# Patient Record
Sex: MALE
[De-identification: names, ages, dates, MRNs, and addresses within clinical notes are randomized per-mention and may not be internally consistent; named-entity substitution may affect disease eponyms.]

---

## 2021-11-23 ENCOUNTER — NON-APPOINTMENT (OUTPATIENT)
Age: 41
End: 2021-11-23

## 2021-11-23 ENCOUNTER — APPOINTMENT (OUTPATIENT)
Dept: PULMONOLOGY | Facility: CLINIC | Age: 41
End: 2021-11-23
Payer: COMMERCIAL

## 2021-11-23 VITALS
HEART RATE: 67 BPM | OXYGEN SATURATION: 100 % | DIASTOLIC BLOOD PRESSURE: 67 MMHG | SYSTOLIC BLOOD PRESSURE: 126 MMHG | TEMPERATURE: 97.8 F

## 2021-11-23 DIAGNOSIS — R07.89 OTHER CHEST PAIN: ICD-10-CM

## 2021-11-23 PROBLEM — Z00.00 ENCOUNTER FOR PREVENTIVE HEALTH EXAMINATION: Status: ACTIVE | Noted: 2021-11-23

## 2021-11-23 PROCEDURE — 71046 X-RAY EXAM CHEST 2 VIEWS: CPT

## 2021-11-23 PROCEDURE — 99244 OFF/OP CNSLTJ NEW/EST MOD 40: CPT | Mod: 25

## 2021-11-23 NOTE — CONSULT LETTER
[Dear  ___] : Dear  [unfilled], [Courtesy Letter:] : I had the pleasure of seeing your patient, [unfilled], in my office today. [Please see my note below.] : Please see my note below. [Consult Closing:] : Thank you very much for allowing me to participate in the care of this patient.  If you have any questions, please do not hesitate to contact me. [Sincerely,] : Sincerely, [FreeTextEntry3] : Dr. Latasha Ghosh

## 2021-11-23 NOTE — PROCEDURE
[FreeTextEntry1] : \par  Xray Chest 2 Views PA/Lat             Final\par  \par Chest x-ray PA and lateral views performed in my office today showed clear lungs, no evidence of infiltrates or pleural effusions. \par \par \par  Ordered by: RICH CORRAL       Collected/Examined: 23Nov2021 11:17AM       \par Verification Required       Stage: Final       \par  Performed at: In Office       Performed by: RICH CORRAL       Resulted: 23Nov2021 11:17AM       Last Updated: 23Nov2021 11:17AM

## 2021-11-23 NOTE — HISTORY OF PRESENT ILLNESS
[TextBox_4] : Michel is a pleasant 41-year-old gentleman with no significant past medical history, he came in complaining of worsening bilateral chest and back pain for the last 2 days, he states that his chest pain usually gets worse with respiration, he also has mild shortness of breath, he does not have cough, fever or chills.  He states that he did a lot of yard work the day before the onset of chest pain.

## 2021-11-23 NOTE — ASSESSMENT
[FreeTextEntry1] : Venipuncture with labs drawn in office\par I advised Michel to use NSAIDs, such as Motrin, for atypical chest pain.\par Also advised him to closely follow with you clinically.

## 2021-11-23 NOTE — DISCUSSION/SUMMARY
[FreeTextEntry1] : Michel is a patient with bilateral atypical chest pain, likely secondary to costochondritis, rule out fibromyalgia, rule out PE (unlikely)

## 2021-11-24 ENCOUNTER — NON-APPOINTMENT (OUTPATIENT)
Age: 41
End: 2021-11-24

## 2021-11-24 ENCOUNTER — APPOINTMENT (OUTPATIENT)
Dept: ORTHOPEDIC SURGERY | Facility: CLINIC | Age: 41
End: 2021-11-24
Payer: COMMERCIAL

## 2021-11-24 VITALS
OXYGEN SATURATION: 98 % | HEART RATE: 64 BPM | DIASTOLIC BLOOD PRESSURE: 77 MMHG | BODY MASS INDEX: 27.83 KG/M2 | SYSTOLIC BLOOD PRESSURE: 146 MMHG | WEIGHT: 210 LBS | HEIGHT: 73 IN

## 2021-11-24 DIAGNOSIS — M54.6 PAIN IN THORACIC SPINE: ICD-10-CM

## 2021-11-24 LAB
ANION GAP SERPL CALC-SCNC: 14 MMOL/L
BASOPHILS # BLD AUTO: 0.03 K/UL
BASOPHILS NFR BLD AUTO: 0.4 %
BUN SERPL-MCNC: 20 MG/DL
CALCIUM SERPL-MCNC: 9.7 MG/DL
CHLORIDE SERPL-SCNC: 104 MMOL/L
CO2 SERPL-SCNC: 24 MMOL/L
CREAT SERPL-MCNC: 0.98 MG/DL
DEPRECATED D DIMER PPP IA-ACNC: <150 NG/ML DDU
EOSINOPHIL # BLD AUTO: 0.08 K/UL
EOSINOPHIL NFR BLD AUTO: 1.1 %
ERYTHROCYTE [SEDIMENTATION RATE] IN BLOOD BY WESTERGREN METHOD: 18 MM/HR
GLUCOSE SERPL-MCNC: 128 MG/DL
HCT VFR BLD CALC: 44 %
HGB BLD-MCNC: 13.3 G/DL
IMM GRANULOCYTES NFR BLD AUTO: 0.1 %
LYMPHOCYTES # BLD AUTO: 1.22 K/UL
LYMPHOCYTES NFR BLD AUTO: 16.8 %
MAN DIFF?: NORMAL
MCHC RBC-ENTMCNC: 21.6 PG
MCHC RBC-ENTMCNC: 30.2 GM/DL
MCV RBC AUTO: 71.4 FL
MONOCYTES # BLD AUTO: 0.56 K/UL
MONOCYTES NFR BLD AUTO: 7.7 %
NEUTROPHILS # BLD AUTO: 5.37 K/UL
NEUTROPHILS NFR BLD AUTO: 73.9 %
PLATELET # BLD AUTO: 201 K/UL
POTASSIUM SERPL-SCNC: 4.3 MMOL/L
RBC # BLD: 6.16 M/UL
RBC # FLD: 15.2 %
SODIUM SERPL-SCNC: 142 MMOL/L
WBC # FLD AUTO: 7.27 K/UL

## 2021-11-24 PROCEDURE — 99204 OFFICE O/P NEW MOD 45 MIN: CPT | Mod: 25

## 2021-11-24 PROCEDURE — 20553 NJX 1/MLT TRIGGER POINTS 3/>: CPT

## 2021-11-24 PROCEDURE — 72080 X-RAY EXAM THORACOLMB 2/> VW: CPT

## 2021-11-24 RX ORDER — PREDNISONE 50 MG/1
50 TABLET ORAL DAILY
Qty: 5 | Refills: 0 | Status: ACTIVE | COMMUNITY
Start: 2021-11-24 | End: 1900-01-01

## 2021-11-24 RX ORDER — CYCLOBENZAPRINE HYDROCHLORIDE 5 MG/1
5 TABLET, FILM COATED ORAL
Qty: 28 | Refills: 0 | Status: ACTIVE | COMMUNITY
Start: 2021-11-24 | End: 1900-01-01

## 2023-06-14 ENCOUNTER — APPOINTMENT (OUTPATIENT)
Dept: UROLOGY | Facility: CLINIC | Age: 43
End: 2023-06-14
Payer: COMMERCIAL

## 2023-06-14 VITALS
OXYGEN SATURATION: 99 % | WEIGHT: 210 LBS | HEART RATE: 54 BPM | DIASTOLIC BLOOD PRESSURE: 71 MMHG | BODY MASS INDEX: 27.83 KG/M2 | SYSTOLIC BLOOD PRESSURE: 130 MMHG | HEIGHT: 73 IN

## 2023-06-14 DIAGNOSIS — I86.1 SCROTAL VARICES: ICD-10-CM

## 2023-06-14 DIAGNOSIS — N50.82 SCROTAL PAIN: ICD-10-CM

## 2023-06-14 PROCEDURE — 99204 OFFICE O/P NEW MOD 45 MIN: CPT

## 2023-06-14 NOTE — PHYSICAL EXAM
[Normal Appearance] : normal appearance [Edema] : no peripheral edema [Exaggerated Use Of Accessory Muscles For Inspiration] : no accessory muscle use [Epididymis] : the epididymides were normal [Testes Tenderness] : no tenderness of the testes [Testes Mass (___cm)] : there were no testicular masses [FreeTextEntry1] : Left GIII varicocele. Right GII varicocele

## 2023-06-14 NOTE — HISTORY OF PRESENT ILLNESS
[FreeTextEntry1] : KATHY MEDEIROS is a 42 year M who presents today as a new patient evaluation for varicocele.\par \par Enlarged left "veins" in scrotum for several years. Had vasectomy - not interested in additional children. \par Has aching in this testicle - worse with running, when testicles are low hanging. Pain also worse when his back pain is worse. Pain is dull pain. \par Not too bothersome\par \par Denies gross hematuria, flank pain, fevers, chills, nausea, vomiting.

## 2023-06-14 NOTE — ASSESSMENT
[FreeTextEntry1] : 42 y.o. M with bilateral varicoceles\par - Discussed that varicoceles are common etiology and are present in approximate 1 out of 6 men.\par - Discussed the reason that varicoceles occur is due to impaired drainage from the left gonadal vein into the renal vein\par - Discussed the reasons for intervention are impaired cosmesis, pain, infertility.  Discussed options would include varicocelectomy or embolization \par - He is minimally bothered at this time and prefers observation. He will contact our office in the future if he desires surgery

## 2023-06-14 NOTE — LETTER BODY
[Dear  ___] : Dear  [unfilled], [Consult Letter:] : I had the pleasure of evaluating your patient, [unfilled]. [Please see my note below.] : Please see my note below. [Consult Closing:] : Thank you very much for allowing me to participate in the care of this patient.  If you have any questions, please do not hesitate to contact me. [Sincerely,] : Sincerely, [FreeTextEntry2] : Blayne Reich MD\par [address] [place] [address], [place] [address]\par  [FreeTextEntry3] : Abram Ronquillo MD\par [facility]\par [address] [place] [address], [address]\par [place], [place]\par [address]\par p: [phone]\par f: [phone]

## 2024-03-26 ENCOUNTER — NON-APPOINTMENT (OUTPATIENT)
Age: 44
End: 2024-03-26

## 2024-03-27 ENCOUNTER — EMERGENCY (EMERGENCY)
Facility: HOSPITAL | Age: 44
LOS: 1 days | Discharge: HOME | End: 2024-03-27
Attending: EMERGENCY MEDICINE
Payer: COMMERCIAL

## 2024-03-27 VITALS
HEART RATE: 82 BPM | DIASTOLIC BLOOD PRESSURE: 89 MMHG | HEIGHT: 72 IN | TEMPERATURE: 98 F | OXYGEN SATURATION: 100 % | SYSTOLIC BLOOD PRESSURE: 151 MMHG | WEIGHT: 220.02 LBS | RESPIRATION RATE: 18 BRPM

## 2024-03-27 PROCEDURE — 99285 EMERGENCY DEPT VISIT HI MDM: CPT

## 2024-03-28 VITALS
SYSTOLIC BLOOD PRESSURE: 130 MMHG | DIASTOLIC BLOOD PRESSURE: 65 MMHG | RESPIRATION RATE: 19 BRPM | HEART RATE: 60 BPM | TEMPERATURE: 98 F | OXYGEN SATURATION: 98 %

## 2024-03-28 LAB
ALBUMIN SERPL ELPH-MCNC: 4.7 G/DL — SIGNIFICANT CHANGE UP (ref 3.3–5)
ALP SERPL-CCNC: 50 U/L — SIGNIFICANT CHANGE UP (ref 40–120)
ALT FLD-CCNC: 29 U/L — SIGNIFICANT CHANGE UP (ref 10–45)
ANION GAP SERPL CALC-SCNC: 14 MMOL/L — SIGNIFICANT CHANGE UP (ref 5–17)
AST SERPL-CCNC: 23 U/L — SIGNIFICANT CHANGE UP (ref 10–40)
BASOPHILS # BLD AUTO: 0.05 K/UL — SIGNIFICANT CHANGE UP (ref 0–0.2)
BASOPHILS NFR BLD AUTO: 0.9 % — SIGNIFICANT CHANGE UP (ref 0–2)
BILIRUB SERPL-MCNC: 0.7 MG/DL — SIGNIFICANT CHANGE UP (ref 0.2–1.2)
BUN SERPL-MCNC: 20 MG/DL — SIGNIFICANT CHANGE UP (ref 7–23)
CALCIUM SERPL-MCNC: 9.6 MG/DL — SIGNIFICANT CHANGE UP (ref 8.4–10.5)
CHLORIDE SERPL-SCNC: 101 MMOL/L — SIGNIFICANT CHANGE UP (ref 96–108)
CO2 SERPL-SCNC: 24 MMOL/L — SIGNIFICANT CHANGE UP (ref 22–31)
CREAT SERPL-MCNC: 1.09 MG/DL — SIGNIFICANT CHANGE UP (ref 0.5–1.3)
EGFR: 87 ML/MIN/1.73M2 — SIGNIFICANT CHANGE UP
EOSINOPHIL # BLD AUTO: 0 K/UL — SIGNIFICANT CHANGE UP (ref 0–0.5)
EOSINOPHIL NFR BLD AUTO: 0 % — SIGNIFICANT CHANGE UP (ref 0–6)
GIANT PLATELETS BLD QL SMEAR: PRESENT — SIGNIFICANT CHANGE UP
GLUCOSE SERPL-MCNC: 103 MG/DL — HIGH (ref 70–99)
HCT VFR BLD CALC: 43.2 % — SIGNIFICANT CHANGE UP (ref 39–50)
HGB BLD-MCNC: 13.6 G/DL — SIGNIFICANT CHANGE UP (ref 13–17)
LYMPHOCYTES # BLD AUTO: 1.62 K/UL — SIGNIFICANT CHANGE UP (ref 1–3.3)
LYMPHOCYTES # BLD AUTO: 30.3 % — SIGNIFICANT CHANGE UP (ref 13–44)
MAGNESIUM SERPL-MCNC: 2.1 MG/DL — SIGNIFICANT CHANGE UP (ref 1.6–2.6)
MANUAL SMEAR VERIFICATION: SIGNIFICANT CHANGE UP
MCHC RBC-ENTMCNC: 21.8 PG — LOW (ref 27–34)
MCHC RBC-ENTMCNC: 31.5 GM/DL — LOW (ref 32–36)
MCV RBC AUTO: 69.3 FL — LOW (ref 80–100)
MONOCYTES # BLD AUTO: 0.29 K/UL — SIGNIFICANT CHANGE UP (ref 0–0.9)
MONOCYTES NFR BLD AUTO: 5.4 % — SIGNIFICANT CHANGE UP (ref 2–14)
NEUTROPHILS # BLD AUTO: 3.4 K/UL — SIGNIFICANT CHANGE UP (ref 1.8–7.4)
NEUTROPHILS NFR BLD AUTO: 63.4 % — SIGNIFICANT CHANGE UP (ref 43–77)
PLAT MORPH BLD: ABNORMAL
PLATELET # BLD AUTO: 202 K/UL — SIGNIFICANT CHANGE UP (ref 150–400)
POTASSIUM SERPL-MCNC: 4 MMOL/L — SIGNIFICANT CHANGE UP (ref 3.5–5.3)
POTASSIUM SERPL-SCNC: 4 MMOL/L — SIGNIFICANT CHANGE UP (ref 3.5–5.3)
PROT SERPL-MCNC: 7.8 G/DL — SIGNIFICANT CHANGE UP (ref 6–8.3)
RBC # BLD: 6.23 M/UL — HIGH (ref 4.2–5.8)
RBC # FLD: 15.3 % — HIGH (ref 10.3–14.5)
RBC BLD AUTO: SIGNIFICANT CHANGE UP
SODIUM SERPL-SCNC: 139 MMOL/L — SIGNIFICANT CHANGE UP (ref 135–145)
TROPONIN T, HIGH SENSITIVITY RESULT: <6 NG/L — SIGNIFICANT CHANGE UP (ref 0–51)
TSH SERPL-MCNC: 2.22 UIU/ML — SIGNIFICANT CHANGE UP (ref 0.27–4.2)
WBC # BLD: 5.36 K/UL — SIGNIFICANT CHANGE UP (ref 3.8–10.5)
WBC # FLD AUTO: 5.36 K/UL — SIGNIFICANT CHANGE UP (ref 3.8–10.5)

## 2024-03-28 PROCEDURE — 99285 EMERGENCY DEPT VISIT HI MDM: CPT | Mod: 25

## 2024-03-28 PROCEDURE — 80053 COMPREHEN METABOLIC PANEL: CPT

## 2024-03-28 PROCEDURE — 83735 ASSAY OF MAGNESIUM: CPT

## 2024-03-28 PROCEDURE — 71045 X-RAY EXAM CHEST 1 VIEW: CPT | Mod: 26

## 2024-03-28 PROCEDURE — 93005 ELECTROCARDIOGRAM TRACING: CPT

## 2024-03-28 PROCEDURE — 71045 X-RAY EXAM CHEST 1 VIEW: CPT

## 2024-03-28 PROCEDURE — 84484 ASSAY OF TROPONIN QUANT: CPT

## 2024-03-28 PROCEDURE — 84443 ASSAY THYROID STIM HORMONE: CPT

## 2024-03-28 PROCEDURE — 85025 COMPLETE CBC W/AUTO DIFF WBC: CPT

## 2024-03-28 NOTE — ED PROVIDER NOTE - OBJECTIVE STATEMENT
Attending Tay:  43-year-old male history of mitral valve prolapse presented to the emergency department with lightheadedness and then becoming shortness of breath when blowing the bagpipes earlier tonight this was around 7 PM.  He has had an episode like this before at [place] and first had an episode like this 2 years ago.  He does have anxiety associated with it.  No chest pain no arm pain no back pain, no diaphoresis no nausea or vomiting.  He is currently asymptomatic.

## 2024-03-28 NOTE — ED PROVIDER NOTE - PROGRESS NOTE DETAILS
Attending Tay:  Inform patient of his results.  Workup has been unremarkable.  Discussed should follow-up outpatient with his cardiologist he is comfortable with plan discussed reasons to return to emergency department.

## 2024-03-28 NOTE — ED ADULT NURSE NOTE - OBJECTIVE STATEMENT
43y male w/ pmh of mitral valve prolapse presents with shortness of breath/ palpitations. Pt states he was playing bagpipes earlier today and began to feel shortness of breath, dizziness and palpitations at this time. Pt states this happens at times when he plays bagpipes but this time lasted longer than normal. Pt states this prompted him to go to urgent care where they advised him to come to ED after seeing abnormal ekg. Pt denies chest pain during event. Pt denies fever, chills, nausea, vomiting, diarrhea. Pt denies symptoms in ED is ambulatory and well appearing.

## 2024-03-28 NOTE — ED PROVIDER NOTE - PATIENT PORTAL LINK FT
You can access the FollowMyHealth Patient Portal offered by [facility] by registering at the following website: [email or web address]. By joining [employer]’s FollowMyHealth portal, you will also be able to view your health information using other applications (apps) compatible with our system.

## 2024-03-28 NOTE — ED PROVIDER NOTE - CLINICAL SUMMARY MEDICAL DECISION MAKING FREE TEXT BOX
Attending Tay:  .  43-year-old male history of mitral valve presenting the emergency department with chief complaint of lightheadedness.  Patient states that he was playing earlier today when he felt lightheaded.  Shortly after this he felt anxious shortness of breath.  He states that the last time this happened to him it was on [place] when he is playing.  He is otherwise a healthy individual he ran a marathon last year.  He states that his heart rate is usually low.  He went to urgent care for the symptoms and they told him his EKG was abnormal.  I did review it read that there may have been some potential widening QRS however it was less than 120 and did not look significantly abnormal on my read.  Patient states the symptoms lasted 2 hours he became very anxious with this.  States that he has had this before when given public speaking he becomes anxious and lightheaded but he is not sure which 1 starts first.  He currently has no chest pain no shortness of breath he never had any nausea vomiting never had diaphoresis.  Never having any abdominal pain.  He is pleasant  non toxic well appearing, NC/AT,  conjunctiva non conjected, sclera anicteric, moist mucous membranes, neck supple, heart sounds, normal, no mrg, lungs cta b/l no wrr, abd soft non distended w/ no tenderness, no visual deformities of extremities, axox3, appears mildly anxious, Patient's EKG is read by me shows normal sinus rhythm, 62 bpm, normal axis deviation, AL interval is 180, QRS is 116, no significant ST changes.  Likely event is secondary to blowing the bagpipes and getting lightheaded followed by slight anxiety reaction however given patient's history we will get ACS workup CBC CMP troponin chest x-ray.  On reviewing patient's urgent care ultrasound which she showed me a picture of I do not feel this was abnormal.  Likely discharge home if workup is negative discussed this with the patient who is comfortable plan if everything looks normal and will follow-up with cardiologist.

## 2024-05-09 ENCOUNTER — NON-APPOINTMENT (OUTPATIENT)
Age: 44
End: 2024-05-09

## 2024-05-31 ENCOUNTER — APPOINTMENT (OUTPATIENT)
Dept: PULMONOLOGY | Facility: CLINIC | Age: 44
End: 2024-05-31
Payer: COMMERCIAL

## 2024-05-31 VITALS — OXYGEN SATURATION: 99 % | HEART RATE: 50 BPM | SYSTOLIC BLOOD PRESSURE: 145 MMHG | DIASTOLIC BLOOD PRESSURE: 79 MMHG

## 2024-05-31 DIAGNOSIS — R93.89 ABNORMAL FINDINGS ON DIAGNOSTIC IMAGING OF OTHER SPECIFIED BODY STRUCTURES: ICD-10-CM

## 2024-05-31 DIAGNOSIS — R42 DIZZINESS AND GIDDINESS: ICD-10-CM

## 2024-05-31 DIAGNOSIS — R06.02 SHORTNESS OF BREATH: ICD-10-CM

## 2024-05-31 PROCEDURE — 94010 BREATHING CAPACITY TEST: CPT

## 2024-05-31 PROCEDURE — 94727 GAS DIL/WSHOT DETER LNG VOL: CPT

## 2024-05-31 PROCEDURE — ZZZZZ: CPT

## 2024-05-31 PROCEDURE — 36415 COLL VENOUS BLD VENIPUNCTURE: CPT

## 2024-05-31 PROCEDURE — 94729 DIFFUSING CAPACITY: CPT

## 2024-05-31 PROCEDURE — 99205 OFFICE O/P NEW HI 60 MIN: CPT | Mod: 25

## 2024-05-31 PROCEDURE — 99215 OFFICE O/P EST HI 40 MIN: CPT | Mod: 25

## 2024-05-31 NOTE — ASSESSMENT
[FreeTextEntry1] : Observation.  Hold on playing bagpipes for small period of time. D Dimer sent. F/U chest radiograph in 3 months. F/U 3 months sooner PRN

## 2024-05-31 NOTE — HISTORY OF PRESENT ILLNESS
[TextBox_4] : KATHY MEDEIROS is a 43 year old  M referred for pulmonary evaluation for  Early April went to ER for episode of lightheadedness and dizziness. In ER told likely anxiety.  Occasionally associated shortness of breath no associated chest discomfort. Had EKG and CXR. Does have history of panic attacks. Runs 25-30 miles/week. Saw cardiology and workup negative. Occurs if anxious or playing bagpipes. Has history of bradycardia. Was in [facility]. No cough, wheeze or chest congestion  Past pulmonary history.  N Occupational Exposure. N Family history of pulmonary disease. N Recent travel N Pets Dog not allergic [TextBox_13] : 2-5 [YearQuit] : 2005

## 2024-05-31 NOTE — CONSULT LETTER
[Dear  ___] : Dear ~SRIKANTH, [Consult Letter:] : I had the pleasure of evaluating your patient, [unfilled]. [Consult Closing:] : Thank you very much for allowing me to participate in the care of this patient.  If you have any questions, please do not hesitate to contact me. [Sincerely,] : Sincerely, [FreeTextEntry2] : Blayne Beaver MD [FreeTextEntry3] : [facility]

## 2024-05-31 NOTE — PROCEDURE
[FreeTextEntry1] : 05/31/2024 Pulmonary function testing FEV1, FVC, and FEV1/FVC are within normal limits. TLC is normal. FRC is normal. RV is decreased. RV/TLC ratio is decreased. Single breath diffusion capacity is normal.    1 / 2 Nai Dominique  Report date:3/28/2024 View Order (Report matches exam selected in Patient History pane)     ACC: [id number] EXAM: XR CHEST PORTABLE URGENT 1V ORDERED BY: MENA RUIZ  PROCEDURE DATE: 03/28/2024    INTERPRETATION: CLINICAL INFORMATION: Chest Pain.  TECHNIQUE: Frontal view of the chest.  COMPARISON: None.  FINDINGS:  Heart: Heart size is normal. Pulmonary: No focal consolidations. Right lower lung linear atelectasis. No pneumothorax or pleural effusion. Bones: No acute bony pathology.  IMPRESSION: No focal consolidations.  Right lower lung linear atelectasis.  --- End of Report ---     TONEY RAMAN MD; Resident Radiologist This document has been electronically signed. NAI DOMINIQUE MD; Attending Interventional Radiologist This document has been electronically signed. Mar 28 2024 7:56AM

## 2024-05-31 NOTE — DISCUSSION/SUMMARY
[FreeTextEntry1] : Abnormal chest radiograph with area of right lower lobe atelectasis appears to have resolved. Questionable small right lower lobe nodule. Episodes as described above do not believe related to underlying pulmonary disease.  Probable component of anxiety.  Probable component of hyperventilation which may occur while playing bagpipes.

## 2024-06-03 LAB — DEPRECATED D DIMER PPP IA-ACNC: <150 NG/ML DDU

## 2024-06-04 ENCOUNTER — TRANSCRIPTION ENCOUNTER (OUTPATIENT)
Age: 44
End: 2024-06-04

## 2024-06-11 ENCOUNTER — APPOINTMENT (OUTPATIENT)
Dept: ENDOCRINOLOGY | Facility: CLINIC | Age: 44
End: 2024-06-11
Payer: COMMERCIAL

## 2024-06-11 VITALS
WEIGHT: 220 LBS | OXYGEN SATURATION: 98 % | HEART RATE: 55 BPM | BODY MASS INDEX: 29.16 KG/M2 | SYSTOLIC BLOOD PRESSURE: 138 MMHG | DIASTOLIC BLOOD PRESSURE: 88 MMHG | HEIGHT: 73 IN

## 2024-06-11 DIAGNOSIS — E04.2 NONTOXIC MULTINODULAR GOITER: ICD-10-CM

## 2024-06-11 PROCEDURE — 99204 OFFICE O/P NEW MOD 45 MIN: CPT

## 2024-06-11 NOTE — ASSESSMENT
[FreeTextEntry1] : 44yo M w/ hx of HLD on Lipitor 10mg daily presenting for initial evaluation of thyroid nodules.  #Multiple thyroid nodules  b/l thyroid nodules noted on recent carotid doppler  reports prior hx of thyroid cysts  no prior FNA no neck compressive symptoms at this time  No hx of head/neck RT No personal/family hx of thyroid cancer No hx of high or low thyroid function or thyroid disease in himself or family No hx of Amiodarone/lithium use Recent TSH checked w/ PCP/Cardio in April 2024 wnl  PLAN - obtain US thyroid w/ radiology to determine further management  - discussed with patient the pathophysiology of thyroid nodules as well as further management involved  - if repeat US thyroid again showing sub cm colloid cysts as was seen on prior US thyroid (report to be sent over to the office), will not require further intervention as this is a benign finding.

## 2024-06-11 NOTE — PHYSICAL EXAM
[Alert] : alert [Well Nourished] : well nourished [No Acute Distress] : no acute distress [Well Developed] : well developed [Normal Sclera/Conjunctiva] : normal sclera/conjunctiva [EOMI] : extra ocular movement intact [No Proptosis] : no proptosis [Normal Outer Ear/Nose] : the ears and nose were normal in appearance [Normal Oropharynx] : the oropharynx was normal [No Neck Mass] : no neck mass was observed [Supple] : the neck was supple [Thyroid Not Enlarged] : the thyroid was not enlarged [No Respiratory Distress] : no respiratory distress [No Accessory Muscle Use] : no accessory muscle use [Normal Rate and Effort] : normal respiratory rate and effort [Normal Rate] : heart rate was normal [Regular Rhythm] : with a regular rhythm [Normal Anterior Cervical Nodes] : no anterior cervical lymphadenopathy [Normal Posterior Cervical Nodes] : no posterior cervical lymphadenopathy [No Spinal Tenderness] : no spinal tenderness [Spine Straight] : spine straight [No Stigmata of Cushings Syndrome] : no stigmata of Cushings Syndrome [Normal Gait] : normal gait [Normal Strength/Tone] : muscle strength and tone were normal [No Rash] : no rash [No Motor Deficits] : the motor exam was normal [No Tremors] : no tremors [Oriented x3] : oriented to person, place, and time [Normal Affect] : the affect was normal [Normal Insight/Judgement] : insight and judgment were intact [Normal Mood] : the mood was normal [Acanthosis Nigricans] : no acanthosis nigricans [Acne] : no acne

## 2024-06-11 NOTE — HISTORY OF PRESENT ILLNESS
[FreeTextEntry1] : 42yo M w/ hx of HLD on Lipitor 10mg daily presenting for initial evaluation of thyroid nodules.  Thyroid nodules  Incidentally found on carotid doppler done recently with his cardiologist, Dr. Blayne Correa.  Reports he had seen an endocrinologist once before in the past as well and had an US done in office that showed cysts. He was informed that no further interventions were required for the cysts at that time.  Has not noticed any changes to his neck, no changes to voice, no dysphagia, no compressive symptoms, no sob when laying back down or choking sensation. No pain to anterior neck/swelling or tenderness with palpation.  No hx of head/neck RT No personal/family hx of thyroid cancer  No hx of high or low thyroid function or thyroid disease in himself or family  No hx of Amiodarone/lithium use  TSH recently wnl on labs done with PCP in April 2024.  No hx of DM2 or other endocrinopathies.   Takes Atorvastatin 10mg qhs for HLD.   No ALL   Cardio/PCP Dr. Correa [phone]  Has family hx of Mitral valve prolapse & thalessemia in mother - he reports he and sister are both carriers   Social hx: lives with family, works as , no tobacco use, no drug use, no problems w/ etoh use in the past

## 2024-06-17 ENCOUNTER — OUTPATIENT (OUTPATIENT)
Dept: OUTPATIENT SERVICES | Facility: HOSPITAL | Age: 44
LOS: 1 days | End: 2024-06-17

## 2024-06-17 ENCOUNTER — APPOINTMENT (OUTPATIENT)
Dept: ULTRASOUND IMAGING | Facility: CLINIC | Age: 44
End: 2024-06-17
Payer: COMMERCIAL

## 2024-06-17 PROCEDURE — 76536 US EXAM OF HEAD AND NECK: CPT | Mod: 26

## 2024-09-27 ENCOUNTER — APPOINTMENT (OUTPATIENT)
Dept: PULMONOLOGY | Facility: CLINIC | Age: 44
End: 2024-09-27
Payer: COMMERCIAL

## 2024-09-27 VITALS — OXYGEN SATURATION: 99 % | HEART RATE: 57 BPM | DIASTOLIC BLOOD PRESSURE: 70 MMHG | SYSTOLIC BLOOD PRESSURE: 135 MMHG

## 2024-09-27 DIAGNOSIS — R93.89 ABNORMAL FINDINGS ON DIAGNOSTIC IMAGING OF OTHER SPECIFIED BODY STRUCTURES: ICD-10-CM

## 2024-09-27 PROCEDURE — 71046 X-RAY EXAM CHEST 2 VIEWS: CPT

## 2024-09-27 PROCEDURE — 99213 OFFICE O/P EST LOW 20 MIN: CPT

## 2024-09-27 NOTE — CONSULT LETTER
[Dear  ___] : Dear ~SRIKANTH, [Consult Letter:] : I had the pleasure of evaluating your patient, [unfilled]. [Consult Closing:] : Thank you very much for allowing me to participate in the care of this patient.  If you have any questions, please do not hesitate to contact me. [Sincerely,] : Sincerely, [FreeTextEntry2] : Blayne Beaver MD [FreeTextEntry3] : Bl[facility]ain [facility]

## 2024-09-27 NOTE — PROCEDURE
[FreeTextEntry1] : 05/31/2024 Pulmonary function testing FEV1, FVC, and FEV1/FVC are within normal limits. TLC is normal. FRC is normal. RV is decreased. RV/TLC ratio is decreased. Single breath diffusion capacity is normal.    1 / 2 Nai Dominique  Report date:3/28/2024 View Order (Report matches exam selected in Patient History pane)     ACC: [id number] EXAM: XR CHEST PORTABLE URGENT 1V ORDERED BY: MENA RUIZ  PROCEDURE DATE: 03/28/2024    INTERPRETATION: CLINICAL INFORMATION: Chest Pain.  TECHNIQUE: Frontal view of the chest.  COMPARISON: None.  FINDINGS:  Heart: Heart size is normal. Pulmonary: No focal consolidations. Right lower lung linear atelectasis. No pneumothorax or pleural effusion. Bones: No acute bony pathology.  IMPRESSION: No focal consolidations.  Right lower lung linear atelectasis.  --- End of Report ---     TONEY RAMAN MD; Resident Radiologist This document has been electronically signed. NAI DOMINIQUE MD; Attending Interventional Radiologist This document has been electronically signed. Mar 28 2024 7:56AM

## 2024-09-27 NOTE — HISTORY OF PRESENT ILLNESS
[Former] : former [TextBox_4] :  back to playing bagpipes no issues with breathing runs 20 miles a week  here for repeat CXR No cough, wheezing or chest congestion.  Feeling well.    [TextBox_13] : 2-5 [YearQuit] : 2005

## 2024-09-27 NOTE — DISCUSSION/SUMMARY
[FreeTextEntry1] : Abnormal chest radiograph with area of right lower lobe atelectasis appears to have resolved. No significant abnormality right lower lobe on current film. Clinically well.

## 2025-02-25 ENCOUNTER — APPOINTMENT (OUTPATIENT)
Dept: PLASTIC SURGERY | Facility: CLINIC | Age: 45
End: 2025-02-25
Payer: COMMERCIAL

## 2025-02-25 DIAGNOSIS — R22.0 LOCALIZED SWELLING, MASS AND LUMP, HEAD: ICD-10-CM

## 2025-02-25 PROCEDURE — 99203 OFFICE O/P NEW LOW 30 MIN: CPT

## 2025-02-25 RX ORDER — ATORVASTATIN CALCIUM 10 MG/1
10 TABLET, FILM COATED ORAL
Refills: 0 | Status: ACTIVE | COMMUNITY

## 2025-04-01 ENCOUNTER — LABORATORY RESULT (OUTPATIENT)
Age: 45
End: 2025-04-01

## 2025-04-01 ENCOUNTER — APPOINTMENT (OUTPATIENT)
Dept: PLASTIC SURGERY | Facility: CLINIC | Age: 45
End: 2025-04-01
Payer: COMMERCIAL

## 2025-04-01 DIAGNOSIS — R22.0 LOCALIZED SWELLING, MASS AND LUMP, HEAD: ICD-10-CM

## 2025-04-01 PROCEDURE — 13131 CMPLX RPR F/C/C/M/N/AX/G/H/F: CPT | Mod: 58,59

## 2025-04-01 PROCEDURE — 21011 EXC FACE LES SC <2 CM: CPT
